# Patient Record
Sex: FEMALE | Race: WHITE | Employment: UNEMPLOYED | URBAN - METROPOLITAN AREA
[De-identification: names, ages, dates, MRNs, and addresses within clinical notes are randomized per-mention and may not be internally consistent; named-entity substitution may affect disease eponyms.]

---

## 2019-07-15 ENCOUNTER — HOSPITAL ENCOUNTER (INPATIENT)
Age: 24
LOS: 2 days | Discharge: HOME | DRG: 560 | End: 2019-07-17
Attending: OBSTETRICS & GYNECOLOGY | Admitting: OBSTETRICS & GYNECOLOGY
Payer: COMMERCIAL

## 2019-07-15 PROBLEM — Z34.93 NORMAL PREGNANCY IN THIRD TRIMESTER: Status: ACTIVE | Noted: 2019-07-15

## 2019-07-15 LAB
ABO/RH: NORMAL
ALBUMIN SERPL-MCNC: 3.4 G/DL (ref 3.5–5.2)
ALP BLD-CCNC: 169 U/L (ref 35–104)
ALT SERPL-CCNC: 9 U/L (ref 0–32)
AMPHETAMINE SCREEN, URINE: NOT DETECTED
ANION GAP SERPL CALCULATED.3IONS-SCNC: 14 MMOL/L (ref 7–16)
ANTIBODY SCREEN: NORMAL
AST SERPL-CCNC: 16 U/L (ref 0–31)
BARBITURATE SCREEN URINE: NOT DETECTED
BASOPHILS ABSOLUTE: 0.03 E9/L (ref 0–0.2)
BASOPHILS RELATIVE PERCENT: 0.3 % (ref 0–2)
BENZODIAZEPINE SCREEN, URINE: NOT DETECTED
BILIRUB SERPL-MCNC: 0.4 MG/DL (ref 0–1.2)
BUN BLDV-MCNC: 7 MG/DL (ref 6–20)
CALCIUM SERPL-MCNC: 8.4 MG/DL (ref 8.6–10.2)
CANNABINOID SCREEN URINE: NOT DETECTED
CHLORIDE BLD-SCNC: 105 MMOL/L (ref 98–107)
CO2: 20 MMOL/L (ref 22–29)
COCAINE METABOLITE SCREEN URINE: NOT DETECTED
CREAT SERPL-MCNC: 0.6 MG/DL (ref 0.5–1)
EOSINOPHILS ABSOLUTE: 0.05 E9/L (ref 0.05–0.5)
EOSINOPHILS RELATIVE PERCENT: 0.5 % (ref 0–6)
GFR AFRICAN AMERICAN: >60
GFR NON-AFRICAN AMERICAN: >60 ML/MIN/1.73
GLUCOSE BLD-MCNC: 144 MG/DL (ref 74–99)
HCT VFR BLD CALC: 37.4 % (ref 34–48)
HEMOGLOBIN: 12.3 G/DL (ref 11.5–15.5)
IMMATURE GRANULOCYTES #: 0.05 E9/L
IMMATURE GRANULOCYTES %: 0.5 % (ref 0–5)
LYMPHOCYTES ABSOLUTE: 1.18 E9/L (ref 1.5–4)
LYMPHOCYTES RELATIVE PERCENT: 10.8 % (ref 20–42)
MCH RBC QN AUTO: 29.1 PG (ref 26–35)
MCHC RBC AUTO-ENTMCNC: 32.9 % (ref 32–34.5)
MCV RBC AUTO: 88.6 FL (ref 80–99.9)
METHADONE SCREEN, URINE: NOT DETECTED
MONOCYTES ABSOLUTE: 0.59 E9/L (ref 0.1–0.95)
MONOCYTES RELATIVE PERCENT: 5.4 % (ref 2–12)
NEUTROPHILS ABSOLUTE: 9.03 E9/L (ref 1.8–7.3)
NEUTROPHILS RELATIVE PERCENT: 82.5 % (ref 43–80)
OPIATE SCREEN URINE: NOT DETECTED
PDW BLD-RTO: 13.3 FL (ref 11.5–15)
PHENCYCLIDINE SCREEN URINE: NOT DETECTED
PLATELET # BLD: 312 E9/L (ref 130–450)
PMV BLD AUTO: 10 FL (ref 7–12)
POTASSIUM SERPL-SCNC: 3.6 MMOL/L (ref 3.5–5)
PROPOXYPHENE SCREEN: NOT DETECTED
RBC # BLD: 4.22 E12/L (ref 3.5–5.5)
SODIUM BLD-SCNC: 139 MMOL/L (ref 132–146)
TOTAL PROTEIN: 6.5 G/DL (ref 6.4–8.3)
WBC # BLD: 10.9 E9/L (ref 4.5–11.5)

## 2019-07-15 PROCEDURE — 10907ZC DRAINAGE OF AMNIOTIC FLUID, THERAPEUTIC FROM PRODUCTS OF CONCEPTION, VIA NATURAL OR ARTIFICIAL OPENING: ICD-10-PCS | Performed by: OBSTETRICS & GYNECOLOGY

## 2019-07-15 PROCEDURE — 2580000003 HC RX 258: Performed by: OBSTETRICS & GYNECOLOGY

## 2019-07-15 PROCEDURE — 6360000002 HC RX W HCPCS: Performed by: OBSTETRICS & GYNECOLOGY

## 2019-07-15 PROCEDURE — 80053 COMPREHEN METABOLIC PANEL: CPT

## 2019-07-15 PROCEDURE — 36415 COLL VENOUS BLD VENIPUNCTURE: CPT

## 2019-07-15 PROCEDURE — 0KQM0ZZ REPAIR PERINEUM MUSCLE, OPEN APPROACH: ICD-10-PCS | Performed by: OBSTETRICS & GYNECOLOGY

## 2019-07-15 PROCEDURE — 86901 BLOOD TYPING SEROLOGIC RH(D): CPT

## 2019-07-15 PROCEDURE — 6370000000 HC RX 637 (ALT 250 FOR IP): Performed by: OBSTETRICS & GYNECOLOGY

## 2019-07-15 PROCEDURE — 86850 RBC ANTIBODY SCREEN: CPT

## 2019-07-15 PROCEDURE — 7200000001 HC VAGINAL DELIVERY

## 2019-07-15 PROCEDURE — 1220000001 HC SEMI PRIVATE L&D R&B

## 2019-07-15 PROCEDURE — 86900 BLOOD TYPING SEROLOGIC ABO: CPT

## 2019-07-15 PROCEDURE — 85025 COMPLETE CBC W/AUTO DIFF WBC: CPT

## 2019-07-15 PROCEDURE — 80307 DRUG TEST PRSMV CHEM ANLYZR: CPT

## 2019-07-15 RX ORDER — LANOLIN 100 %
OINTMENT (GRAM) TOPICAL PRN
Status: DISCONTINUED | OUTPATIENT
Start: 2019-07-15 | End: 2019-07-17 | Stop reason: HOSPADM

## 2019-07-15 RX ORDER — HYDROCODONE BITARTRATE AND ACETAMINOPHEN 5; 325 MG/1; MG/1
1 TABLET ORAL EVERY 4 HOURS PRN
Status: DISCONTINUED | OUTPATIENT
Start: 2019-07-15 | End: 2019-07-17 | Stop reason: HOSPADM

## 2019-07-15 RX ORDER — IBUPROFEN 800 MG/1
800 TABLET ORAL EVERY 8 HOURS PRN
Status: DISCONTINUED | OUTPATIENT
Start: 2019-07-15 | End: 2019-07-17 | Stop reason: HOSPADM

## 2019-07-15 RX ORDER — SODIUM CHLORIDE, SODIUM LACTATE, POTASSIUM CHLORIDE, CALCIUM CHLORIDE 600; 310; 30; 20 MG/100ML; MG/100ML; MG/100ML; MG/100ML
INJECTION, SOLUTION INTRAVENOUS CONTINUOUS
Status: DISCONTINUED | OUTPATIENT
Start: 2019-07-15 | End: 2019-07-15

## 2019-07-15 RX ORDER — SODIUM CHLORIDE 0.9 % (FLUSH) 0.9 %
10 SYRINGE (ML) INJECTION EVERY 12 HOURS SCHEDULED
Status: DISCONTINUED | OUTPATIENT
Start: 2019-07-15 | End: 2019-07-17 | Stop reason: HOSPADM

## 2019-07-15 RX ORDER — ACETAMINOPHEN 325 MG/1
650 TABLET ORAL EVERY 4 HOURS PRN
Status: DISCONTINUED | OUTPATIENT
Start: 2019-07-15 | End: 2019-07-17 | Stop reason: HOSPADM

## 2019-07-15 RX ORDER — HYDROCODONE BITARTRATE AND ACETAMINOPHEN 5; 325 MG/1; MG/1
2 TABLET ORAL EVERY 4 HOURS PRN
Status: DISCONTINUED | OUTPATIENT
Start: 2019-07-15 | End: 2019-07-17 | Stop reason: HOSPADM

## 2019-07-15 RX ORDER — ACETAMINOPHEN 325 MG/1
650 TABLET ORAL EVERY 4 HOURS PRN
Status: DISCONTINUED | OUTPATIENT
Start: 2019-07-15 | End: 2019-07-15

## 2019-07-15 RX ORDER — ONDANSETRON 2 MG/ML
4 INJECTION INTRAMUSCULAR; INTRAVENOUS EVERY 6 HOURS PRN
Status: DISCONTINUED | OUTPATIENT
Start: 2019-07-15 | End: 2019-07-15

## 2019-07-15 RX ORDER — PENICILLIN G POTASSIUM 5000000 [IU]/1
INJECTION, POWDER, FOR SOLUTION INTRAMUSCULAR; INTRAVENOUS
Status: DISCONTINUED
Start: 2019-07-15 | End: 2019-07-15

## 2019-07-15 RX ORDER — DOCUSATE SODIUM 100 MG/1
100 CAPSULE, LIQUID FILLED ORAL 2 TIMES DAILY
Status: DISCONTINUED | OUTPATIENT
Start: 2019-07-15 | End: 2019-07-17 | Stop reason: HOSPADM

## 2019-07-15 RX ORDER — DOCUSATE SODIUM 100 MG/1
100 CAPSULE, LIQUID FILLED ORAL 2 TIMES DAILY
Status: DISCONTINUED | OUTPATIENT
Start: 2019-07-15 | End: 2019-07-15

## 2019-07-15 RX ORDER — SODIUM CHLORIDE 0.9 % (FLUSH) 0.9 %
10 SYRINGE (ML) INJECTION PRN
Status: DISCONTINUED | OUTPATIENT
Start: 2019-07-15 | End: 2019-07-15

## 2019-07-15 RX ORDER — IBUPROFEN 800 MG/1
800 TABLET ORAL EVERY 6 HOURS PRN
Status: DISCONTINUED | OUTPATIENT
Start: 2019-07-15 | End: 2019-07-15 | Stop reason: SDUPTHER

## 2019-07-15 RX ORDER — IBUPROFEN 800 MG/1
TABLET ORAL
Status: DISPENSED
Start: 2019-07-15 | End: 2019-07-16

## 2019-07-15 RX ORDER — SODIUM CHLORIDE 0.9 % (FLUSH) 0.9 %
10 SYRINGE (ML) INJECTION EVERY 12 HOURS SCHEDULED
Status: DISCONTINUED | OUTPATIENT
Start: 2019-07-15 | End: 2019-07-15

## 2019-07-15 RX ORDER — SODIUM CHLORIDE 0.9 % (FLUSH) 0.9 %
10 SYRINGE (ML) INJECTION PRN
Status: DISCONTINUED | OUTPATIENT
Start: 2019-07-15 | End: 2019-07-17 | Stop reason: HOSPADM

## 2019-07-15 RX ADMIN — IBUPROFEN 800 MG: 800 TABLET, FILM COATED ORAL at 16:10

## 2019-07-15 RX ADMIN — Medication 999 MILLI-UNITS/MIN: at 14:35

## 2019-07-15 RX ADMIN — WITCH HAZEL 40 EACH: 500 SOLUTION RECTAL; TOPICAL at 16:10

## 2019-07-15 RX ADMIN — SODIUM CHLORIDE, POTASSIUM CHLORIDE, SODIUM LACTATE AND CALCIUM CHLORIDE: 600; 310; 30; 20 INJECTION, SOLUTION INTRAVENOUS at 14:20

## 2019-07-15 RX ADMIN — BENZOCAINE AND LEVOMENTHOL: 200; 5 SPRAY TOPICAL at 16:11

## 2019-07-15 SDOH — HEALTH STABILITY: MENTAL HEALTH: HOW OFTEN DO YOU HAVE A DRINK CONTAINING ALCOHOL?: NEVER

## 2019-07-15 ASSESSMENT — PAIN SCALES - GENERAL: PAINLEVEL_OUTOF10: 3

## 2019-07-15 NOTE — PROGRESS NOTES
RN's x 2 in room, patient restless in bed, co urge to push. Breathing techniques reviewed. Reassurance given. FHT and Uterine activity assessed q15 min per protocol.

## 2019-07-15 NOTE — PROGRESS NOTES
Patient presents to unit with co contractions since midnight. Perceives fetal movement, denies bleeding or leaking of fluid. Significant other present and supportive.

## 2019-07-16 PROCEDURE — 1220000001 HC SEMI PRIVATE L&D R&B

## 2019-07-16 PROCEDURE — 6370000000 HC RX 637 (ALT 250 FOR IP): Performed by: OBSTETRICS & GYNECOLOGY

## 2019-07-16 RX ADMIN — HYDROCODONE BITARTRATE AND ACETAMINOPHEN 1 TABLET: 5; 325 TABLET ORAL at 12:36

## 2019-07-16 RX ADMIN — DOCUSATE SODIUM 100 MG: 100 CAPSULE, LIQUID FILLED ORAL at 09:12

## 2019-07-16 RX ADMIN — DOCUSATE SODIUM 100 MG: 100 CAPSULE, LIQUID FILLED ORAL at 20:47

## 2019-07-16 ASSESSMENT — PAIN SCALES - GENERAL: PAINLEVEL_OUTOF10: 5

## 2019-07-16 NOTE — PLAN OF CARE
Problem: Pain - Acute:  Goal: Pain level will decrease  Description  Pain level will decrease  Outcome: Met This Shift  Note:   Rest, reposition, prn pain meds.

## 2019-07-16 NOTE — PLAN OF CARE
Problem: Infection - Risk of, Puerperal Infection:  Goal: Will show no infection signs and symptoms  Description  Will show no infection signs and symptoms  Outcome: Met This Shift  Note:   Vital signs obtained and within normal limits.

## 2019-07-17 VITALS
OXYGEN SATURATION: 99 % | SYSTOLIC BLOOD PRESSURE: 108 MMHG | BODY MASS INDEX: 30.12 KG/M2 | HEART RATE: 75 BPM | TEMPERATURE: 98.8 F | WEIGHT: 170 LBS | HEIGHT: 63 IN | DIASTOLIC BLOOD PRESSURE: 55 MMHG | RESPIRATION RATE: 16 BRPM

## 2019-07-17 PROBLEM — Z34.93 NORMAL PREGNANCY IN THIRD TRIMESTER: Status: RESOLVED | Noted: 2019-07-15 | Resolved: 2019-07-17

## 2019-07-17 PROCEDURE — 6370000000 HC RX 637 (ALT 250 FOR IP): Performed by: OBSTETRICS & GYNECOLOGY

## 2019-07-17 RX ADMIN — DOCUSATE SODIUM 100 MG: 100 CAPSULE, LIQUID FILLED ORAL at 08:39

## 2019-07-17 NOTE — PROGRESS NOTES
Department of Obstetrics and Gynecology  Labor and Delivery  Post Partum Progress Note    Postpartum Day # 2    SUBJECTIVE:  Pt resting in bed, reports feeling well, denies emotional concerns. Breast feeding with out difficulty. Ambulatory per self. OBJECTIVE:     Vitals:  BP (!) 108/55   Pulse 75   Temp 98.8 °F (37.1 °C) (Oral)   Resp 16   Ht 5' 3\" (1.6 m)   Wt 170 lb (77.1 kg)   SpO2 99%   Breastfeeding?  Unknown   BMI 30.11 kg/m²     LUNGS:  No increased work of breathing, good air exchange, clear to auscultation bilaterally, no crackles or wheezing  CARDIOVASCULAR:  Normal apical impulse, regular rate and rhythm, normal S1 and S2, no S3 or S4, and no murmur noted  ABDOMEN:  normal bowel sounds, soft and non tender  GENITAL/URINARY:  External Genitalia:  General appearance; normal, Hair distribution; normal, Lesions absent  Uterus: @ 2 below U  Minimal Lochia       DATA:    Blood Type:  No results found for: ABOINT  RH:  No results found for: ANATITER, C3, C4, RF  CBC:    Lab Results   Component Value Date    WBC 10.9 07/15/2019    RBC 4.22 07/15/2019    HGB 12.3 07/15/2019    HCT 37.4 07/15/2019    MCV 88.6 07/15/2019    RDW 13.3 07/15/2019     07/15/2019       ASSESSMENT & PLAN:   PPD2   SVB   Breast feeding     Discharge home pending infant

## 2019-07-17 NOTE — CARE COORDINATION
SS Note:  SS Consult received regarding \"FOB arrested in department, ? drugs\" informed by nursing that 2 \" bounty hunters\" came to the unit along with hospital security to apprehend FOB who was in pt's room for an outstanding warrant and he was taken to [facility], sw met with pt supportively and regarding consult, pt was very pleasant, had her  with her and her mother at her bedside who was aware of situation and also supportive, she reports that the charge was drug related but was from over 2 years ago and because he missed his court date, says he will be now going to court tomorrow and should then be released, she reports having a 3year old at home, she denies any current issues of drugs or any safety concerns for herself or her children and that she will have assistance from her mother as needed, no ss needs or parenting concerns relayed. Nursing informed and relayed no concerns with pt or with her parenting.  Electronically signed by KATIUSKA Valerio on 2019 at 12:08 PM

## 2021-11-16 ENCOUNTER — HOSPITAL ENCOUNTER (INPATIENT)
Age: 26
LOS: 2 days | Discharge: HOME | DRG: 560 | End: 2021-11-18
Attending: OBSTETRICS & GYNECOLOGY | Admitting: OBSTETRICS & GYNECOLOGY
Payer: COMMERCIAL

## 2021-11-16 ENCOUNTER — APPOINTMENT (OUTPATIENT)
Dept: LABOR AND DELIVERY | Age: 26
DRG: 560 | End: 2021-11-16
Payer: COMMERCIAL

## 2021-11-16 PROBLEM — Z34.90 ENCOUNTER FOR INDUCTION OF LABOR: Status: ACTIVE | Noted: 2021-11-16

## 2021-11-16 LAB
ABO/RH: NORMAL
ALBUMIN SERPL-MCNC: 3.6 G/DL (ref 3.5–5.2)
ALP BLD-CCNC: 111 U/L (ref 35–104)
ALT SERPL-CCNC: 8 U/L (ref 0–32)
AMPHETAMINE SCREEN, URINE: NOT DETECTED
ANION GAP SERPL CALCULATED.3IONS-SCNC: 14 MMOL/L (ref 7–16)
ANTIBODY SCREEN: NORMAL
AST SERPL-CCNC: 12 U/L (ref 0–31)
BARBITURATE SCREEN URINE: NOT DETECTED
BASOPHILS ABSOLUTE: 0.02 E9/L (ref 0–0.2)
BASOPHILS RELATIVE PERCENT: 0.3 % (ref 0–2)
BENZODIAZEPINE SCREEN, URINE: NOT DETECTED
BILIRUB SERPL-MCNC: 0.3 MG/DL (ref 0–1.2)
BUN BLDV-MCNC: 7 MG/DL (ref 6–20)
CALCIUM SERPL-MCNC: 8.5 MG/DL (ref 8.6–10.2)
CANNABINOID SCREEN URINE: NOT DETECTED
CHLORIDE BLD-SCNC: 104 MMOL/L (ref 98–107)
CO2: 19 MMOL/L (ref 22–29)
COCAINE METABOLITE SCREEN URINE: NOT DETECTED
CREAT SERPL-MCNC: 0.5 MG/DL (ref 0.5–1)
EOSINOPHILS ABSOLUTE: 0.05 E9/L (ref 0.05–0.5)
EOSINOPHILS RELATIVE PERCENT: 0.8 % (ref 0–6)
FENTANYL SCREEN, URINE: NOT DETECTED
GFR AFRICAN AMERICAN: >60
GFR NON-AFRICAN AMERICAN: >60 ML/MIN/1.73
GLUCOSE BLD-MCNC: 63 MG/DL (ref 74–99)
HCT VFR BLD CALC: 34.3 % (ref 34–48)
HEMOGLOBIN: 11 G/DL (ref 11.5–15.5)
IMMATURE GRANULOCYTES #: 0.02 E9/L
IMMATURE GRANULOCYTES %: 0.3 % (ref 0–5)
LYMPHOCYTES ABSOLUTE: 1.34 E9/L (ref 1.5–4)
LYMPHOCYTES RELATIVE PERCENT: 21.6 % (ref 20–42)
Lab: NORMAL
MCH RBC QN AUTO: 27.8 PG (ref 26–35)
MCHC RBC AUTO-ENTMCNC: 32.1 % (ref 32–34.5)
MCV RBC AUTO: 86.8 FL (ref 80–99.9)
METHADONE SCREEN, URINE: NOT DETECTED
MONOCYTES ABSOLUTE: 0.45 E9/L (ref 0.1–0.95)
MONOCYTES RELATIVE PERCENT: 7.3 % (ref 2–12)
NEUTROPHILS ABSOLUTE: 4.32 E9/L (ref 1.8–7.3)
NEUTROPHILS RELATIVE PERCENT: 69.7 % (ref 43–80)
OPIATE SCREEN URINE: NOT DETECTED
OXYCODONE URINE: NOT DETECTED
PDW BLD-RTO: 13.5 FL (ref 11.5–15)
PHENCYCLIDINE SCREEN URINE: NOT DETECTED
PLATELET # BLD: 272 E9/L (ref 130–450)
PMV BLD AUTO: 10.2 FL (ref 7–12)
POTASSIUM SERPL-SCNC: 3.8 MMOL/L (ref 3.5–5)
RBC # BLD: 3.95 E12/L (ref 3.5–5.5)
SODIUM BLD-SCNC: 137 MMOL/L (ref 132–146)
TOTAL PROTEIN: 6.2 G/DL (ref 6.4–8.3)
WBC # BLD: 6.2 E9/L (ref 4.5–11.5)

## 2021-11-16 PROCEDURE — 1220000001 HC SEMI PRIVATE L&D R&B

## 2021-11-16 PROCEDURE — 85025 COMPLETE CBC W/AUTO DIFF WBC: CPT

## 2021-11-16 PROCEDURE — 6360000002 HC RX W HCPCS: Performed by: OBSTETRICS & GYNECOLOGY

## 2021-11-16 PROCEDURE — 86901 BLOOD TYPING SEROLOGIC RH(D): CPT

## 2021-11-16 PROCEDURE — 80053 COMPREHEN METABOLIC PANEL: CPT

## 2021-11-16 PROCEDURE — 86850 RBC ANTIBODY SCREEN: CPT

## 2021-11-16 PROCEDURE — 36415 COLL VENOUS BLD VENIPUNCTURE: CPT

## 2021-11-16 PROCEDURE — 86900 BLOOD TYPING SEROLOGIC ABO: CPT

## 2021-11-16 PROCEDURE — 2580000003 HC RX 258: Performed by: OBSTETRICS & GYNECOLOGY

## 2021-11-16 PROCEDURE — 80307 DRUG TEST PRSMV CHEM ANLYZR: CPT

## 2021-11-16 RX ORDER — SODIUM CHLORIDE, SODIUM LACTATE, POTASSIUM CHLORIDE, CALCIUM CHLORIDE 600; 310; 30; 20 MG/100ML; MG/100ML; MG/100ML; MG/100ML
INJECTION, SOLUTION INTRAVENOUS CONTINUOUS
Status: DISCONTINUED | OUTPATIENT
Start: 2021-11-16 | End: 2021-11-18 | Stop reason: HOSPADM

## 2021-11-16 RX ORDER — SODIUM CHLORIDE, SODIUM LACTATE, POTASSIUM CHLORIDE, AND CALCIUM CHLORIDE .6; .31; .03; .02 G/100ML; G/100ML; G/100ML; G/100ML
500 INJECTION, SOLUTION INTRAVENOUS PRN
Status: DISCONTINUED | OUTPATIENT
Start: 2021-11-16 | End: 2021-11-18 | Stop reason: HOSPADM

## 2021-11-16 RX ORDER — NALBUPHINE HCL 10 MG/ML
10 AMPUL (ML) INJECTION
Status: DISCONTINUED | OUTPATIENT
Start: 2021-11-16 | End: 2021-11-18 | Stop reason: HOSPADM

## 2021-11-16 RX ORDER — ONDANSETRON 2 MG/ML
4 INJECTION INTRAMUSCULAR; INTRAVENOUS EVERY 6 HOURS PRN
Status: DISCONTINUED | OUTPATIENT
Start: 2021-11-16 | End: 2021-11-18 | Stop reason: HOSPADM

## 2021-11-16 RX ORDER — SODIUM CHLORIDE 9 MG/ML
25 INJECTION, SOLUTION INTRAVENOUS PRN
Status: DISCONTINUED | OUTPATIENT
Start: 2021-11-16 | End: 2021-11-18 | Stop reason: HOSPADM

## 2021-11-16 RX ORDER — DOCUSATE SODIUM 100 MG/1
100 CAPSULE, LIQUID FILLED ORAL 2 TIMES DAILY
Status: DISCONTINUED | OUTPATIENT
Start: 2021-11-16 | End: 2021-11-17 | Stop reason: ALTCHOICE

## 2021-11-16 RX ORDER — SODIUM CHLORIDE 0.9 % (FLUSH) 0.9 %
5-40 SYRINGE (ML) INJECTION PRN
Status: DISCONTINUED | OUTPATIENT
Start: 2021-11-16 | End: 2021-11-18 | Stop reason: HOSPADM

## 2021-11-16 RX ORDER — SODIUM CHLORIDE 0.9 % (FLUSH) 0.9 %
5-40 SYRINGE (ML) INJECTION EVERY 12 HOURS SCHEDULED
Status: DISCONTINUED | OUTPATIENT
Start: 2021-11-16 | End: 2021-11-18 | Stop reason: HOSPADM

## 2021-11-16 RX ORDER — BUTORPHANOL TARTRATE 1 MG/ML
1 INJECTION, SOLUTION INTRAMUSCULAR; INTRAVENOUS
Status: DISCONTINUED | OUTPATIENT
Start: 2021-11-16 | End: 2021-11-16

## 2021-11-16 RX ORDER — SODIUM CHLORIDE, SODIUM LACTATE, POTASSIUM CHLORIDE, AND CALCIUM CHLORIDE .6; .31; .03; .02 G/100ML; G/100ML; G/100ML; G/100ML
1000 INJECTION, SOLUTION INTRAVENOUS PRN
Status: DISCONTINUED | OUTPATIENT
Start: 2021-11-16 | End: 2021-11-18 | Stop reason: HOSPADM

## 2021-11-16 RX ADMIN — SODIUM CHLORIDE, POTASSIUM CHLORIDE, SODIUM LACTATE AND CALCIUM CHLORIDE: 600; 310; 30; 20 INJECTION, SOLUTION INTRAVENOUS at 11:45

## 2021-11-16 RX ADMIN — Medication 2 MILLI-UNITS/MIN: at 13:00

## 2021-11-16 RX ADMIN — SODIUM CHLORIDE, POTASSIUM CHLORIDE, SODIUM LACTATE AND CALCIUM CHLORIDE: 600; 310; 30; 20 INJECTION, SOLUTION INTRAVENOUS at 15:37

## 2021-11-16 RX ADMIN — SODIUM CHLORIDE, POTASSIUM CHLORIDE, SODIUM LACTATE AND CALCIUM CHLORIDE: 600; 310; 30; 20 INJECTION, SOLUTION INTRAVENOUS at 21:58

## 2021-11-16 NOTE — PROGRESS NOTES
40w3d  here for scheduled IOL. Denies and LOF, bleeding, or SOB. States she is feeling occasional contractions but denies any pain at this time. Vitals are stable. EFM and Burdette applied. Significant other is at the bedside. POC discussed. Call bell is within reach. States no needs at this time.

## 2021-11-17 PROCEDURE — 1220000001 HC SEMI PRIVATE L&D R&B

## 2021-11-17 PROCEDURE — 7200000001 HC VAGINAL DELIVERY

## 2021-11-17 PROCEDURE — 6360000002 HC RX W HCPCS: Performed by: OBSTETRICS & GYNECOLOGY

## 2021-11-17 PROCEDURE — 3E033VJ INTRODUCTION OF OTHER HORMONE INTO PERIPHERAL VEIN, PERCUTANEOUS APPROACH: ICD-10-PCS | Performed by: OBSTETRICS & GYNECOLOGY

## 2021-11-17 PROCEDURE — 6370000000 HC RX 637 (ALT 250 FOR IP): Performed by: OBSTETRICS & GYNECOLOGY

## 2021-11-17 PROCEDURE — 0HQ9XZZ REPAIR PERINEUM SKIN, EXTERNAL APPROACH: ICD-10-PCS | Performed by: OBSTETRICS & GYNECOLOGY

## 2021-11-17 PROCEDURE — 6370000000 HC RX 637 (ALT 250 FOR IP)

## 2021-11-17 RX ORDER — KETOROLAC TROMETHAMINE 30 MG/ML
30 INJECTION, SOLUTION INTRAMUSCULAR; INTRAVENOUS EVERY 6 HOURS PRN
Status: ACTIVE | OUTPATIENT
Start: 2021-11-17 | End: 2021-11-17

## 2021-11-17 RX ORDER — FERROUS SULFATE 325(65) MG
325 TABLET ORAL 2 TIMES DAILY WITH MEALS
Status: DISCONTINUED | OUTPATIENT
Start: 2021-11-17 | End: 2021-11-18 | Stop reason: HOSPADM

## 2021-11-17 RX ORDER — ACETAMINOPHEN 325 MG/1
650 TABLET ORAL EVERY 4 HOURS PRN
Status: DISCONTINUED | OUTPATIENT
Start: 2021-11-17 | End: 2021-11-18 | Stop reason: HOSPADM

## 2021-11-17 RX ORDER — OXYCODONE HYDROCHLORIDE AND ACETAMINOPHEN 5; 325 MG/1; MG/1
1 TABLET ORAL EVERY 4 HOURS PRN
Status: DISCONTINUED | OUTPATIENT
Start: 2021-11-17 | End: 2021-11-18 | Stop reason: HOSPADM

## 2021-11-17 RX ORDER — IBUPROFEN 800 MG/1
800 TABLET ORAL EVERY 8 HOURS PRN
Status: DISCONTINUED | OUTPATIENT
Start: 2021-11-17 | End: 2021-11-18 | Stop reason: HOSPADM

## 2021-11-17 RX ORDER — OXYCODONE HYDROCHLORIDE AND ACETAMINOPHEN 5; 325 MG/1; MG/1
2 TABLET ORAL EVERY 4 HOURS PRN
Status: DISCONTINUED | OUTPATIENT
Start: 2021-11-17 | End: 2021-11-18 | Stop reason: HOSPADM

## 2021-11-17 RX ORDER — DOCUSATE SODIUM 100 MG/1
100 CAPSULE, LIQUID FILLED ORAL DAILY
Status: DISCONTINUED | OUTPATIENT
Start: 2021-11-17 | End: 2021-11-18 | Stop reason: HOSPADM

## 2021-11-17 RX ORDER — MODIFIED LANOLIN
OINTMENT (GRAM) TOPICAL PRN
Status: DISCONTINUED | OUTPATIENT
Start: 2021-11-17 | End: 2021-11-18 | Stop reason: HOSPADM

## 2021-11-17 RX ADMIN — IBUPROFEN 800 MG: 800 TABLET, FILM COATED ORAL at 18:49

## 2021-11-17 RX ADMIN — NALBUPHINE HYDROCHLORIDE 10 MG: 10 INJECTION, SOLUTION INTRAMUSCULAR; INTRAVENOUS; SUBCUTANEOUS at 01:07

## 2021-11-17 RX ADMIN — WITCH HAZEL 40 EACH: 500 SOLUTION RECTAL; TOPICAL at 09:49

## 2021-11-17 RX ADMIN — IBUPROFEN 800 MG: 800 TABLET, FILM COATED ORAL at 10:44

## 2021-11-17 RX ADMIN — ACETAMINOPHEN 650 MG: 325 TABLET ORAL at 18:09

## 2021-11-17 RX ADMIN — BENZOCAINE AND LEVOMENTHOL: 200; 5 SPRAY TOPICAL at 09:48

## 2021-11-17 ASSESSMENT — PAIN SCALES - GENERAL
PAINLEVEL_OUTOF10: 10
PAINLEVEL_OUTOF10: 4
PAINLEVEL_OUTOF10: 4
PAINLEVEL_OUTOF10: 5
PAINLEVEL_OUTOF10: 4

## 2021-11-17 ASSESSMENT — PAIN DESCRIPTION - DESCRIPTORS
DESCRIPTORS: ACHING;CRAMPING
DESCRIPTORS: ACHING;CRAMPING

## 2021-11-17 NOTE — L&D DELIVERY SUMMARY NOTE
Department of Obstetrics and Gynecology  Spontaneous Vaginal Delivery Note  Erika Nino [facility],[id number]    Pre-operative Diagnosis: Term pregnancy induced with Pitocin, GBS negative    Post-operative Diagnosis:  Living  infant(s) and Female    Information for the patient's :  Ansley Nino [[id number]]             Female infant            Infant Wt:   Information for the patient's :  Lexx Wiley [[id number]]        8 pound 7 ounce    APGARS:     Information for the patient's :  Ansley Nino [[id number]]        Apgars 9 at 1 minute 9 at 5 minutes     Anesthesia:  none    Application and Delivery: First-degree perineal tear    Delivery Summary:   Patient is admitted to R and placed on external monitors. Contractions are irregular, Pitocin started as per protocol FHT reactive with moderate variability without decels. Pt received analgesics IV external monitoring was continued through the night and Pitocin was stopped at around 11:00 at night and restarted at 3:00 and . Progress of labor as anticipated and now fully dilated cervix. With subsequent contractions she started pushing and delivered vaginally spontaneously without any complications with first-degree perineal tear,Placenta and cord and membranes delivered spontaneously. Pt is given pitocin in IV fluids. . First-degree perineal tear is repaired in standard manner. Vaginal walls,cervix,perineum,rectum intact. Uterus is well contracted. Pt is watched for next 1 hour. mother and baby both are  stable and doing well. Routine postpartum care    Specimen:  Placenta sent to pathology No    Estimated blood loss: 400 cc             Condition:  infant stable to general nursery and mother stable               Infant Feeding:    both breast and bottle - Similac with low iron    Johanny Max MD,MRORY. 2021 8:49 AM    Emiliana Bryant

## 2021-11-17 NOTE — H&P
Department of Obstetrics and Gynecology   Obstetrics History and Physical      [facility]  2021  [id number]    CHIEF COMPLAINT: Term pregnancy for induction of labor    HISTORY OF PRESENT ILLNESS:      The patient is a 32 y.o. female  at 44w3d. For induction of labor with Pitocin as Rose score is approximately 6  OB History        3    Para   2    Term   1       0    AB   0    Living   1       SAB   0    IAB   0    Ectopic   0    Molar   0    Multiple   0    Live Births   1            Patient presents with a chief complaint as above and is being admitted for induction of labor with Pitocin    Estimated Due Date: Estimated Date of Delivery: 21    PRENATAL CARE:    Complicated by:   Patient Active Problem List   Diagnosis Code    Term birth of  male Z45.0   [facility] Encounter for induction of labor Z34.90       PAST OB HISTORY  OB History        3    Para   2    Term   1       0    AB   0    Living   1       SAB   0    IAB   0    Ectopic   0    Molar   0    Multiple   0    Live Births   1                Past Medical History:    History reviewed. No pertinent past medical history. Past Surgical History:    History reviewed. No pertinent surgical history. Allergies:  Patient has no known allergies.   Social History:    Social History     Socioeconomic History    Marital status: Single     Spouse name: Not on file    Number of children: Not on file    Years of education: Not on file    Highest education level: Not on file   Occupational History    Not on file   Tobacco Use    Smoking status: Never Smoker    Smokeless tobacco: Never Used   Vaping Use    Vaping Use: Never used   Substance and Sexual Activity    Alcohol use: Never    Drug use: Never    Sexual activity: Never   Other Topics Concern    Not on file   Social History Narrative    Not on file     Social Determinants of Health     Financial Resource Strain:     Difficulty of Paying Living Expenses: Not on file   Food Insecurity:     Worried About [address] in the Last Year: Not on file    Dharmesh of Food in the Last Year: Not on file   Transportation Needs:     Lack of Transportation (Medical): Not on file    Lack of Transportation (Non-Medical): Not on file   Physical Activity:     Days of Exercise per Week: Not on file    Minutes of Exercise per Session: Not on file   Stress:     Feeling of Stress : Not on file   Social Connections:     Frequency of Communication with Friends and Family: Not on file    Frequency of Social Gatherings with Friends and Family: Not on file    Attends [employer] Services: Not on file    Active Member of [address] [place] [address] or Organizations: Not on file    Attends Club or Organization Meetings: Not on file    Marital Status: Not on file   Intimate Partner Violence:     Fear of Current or Ex-Partner: Not on file    Emotionally Abused: Not on file    Physically Abused: Not on file    Sexually Abused: Not on file   Housing Stability:     Unable to Pay for Housing in the Last Year: Not on file    Number of [place] in the Last Year: Not on file    Unstable Housing in the Last Year: Not on file     Family History:   History reviewed. No pertinent family history. Medications Prior to Admission:  Medications Prior to Admission: Prenatal MV-Min-Fe Fum-FA-DHA (PRENATAL 1 PO), Take 1 tablet by mouth daily    REVIEW OF SYSTEMS:    CONSTITUTIONAL:  negative  RESPIRATORY:  negative  CARDIOVASCULAR:  negative  GASTROINTESTINAL:  Negative  GENITOURINARY: Negative  ALLERGIC/IMMUNOLOGIC:  negative  NEUROLOGICAL:  negative  BEHAVIOR/PSYCH:  negative    PHYSICAL EXAM:  Blood pressure (!) 109/57, pulse 72, temperature 98.2 °F (36.8 °C), temperature source Oral, resp. rate 18, height 5' 3\" (1.6 m), weight 160 lb (72.6 kg), SpO2 99 %, unknown if currently breastfeeding.   General appearance:  awake, alert, cooperative, no apparent distress, and appears stated age  Abdomen:  Gravid uterus, consistent with her gestational age 44w3d, irregular uterine contractions. , CVA tenderness No      Fetal heart rate:  Reassuring.  140,with accels and moderate variability,no decels,  Pelvis:  Adequate pelvis  Cervix: soft   2 cm   50%    -2  Presentation: CEPHALIC  Membranes:  intact  Extremities: nontender bilaterally,edema1+    DATA:  Labs:  CBC:   Lab Results   Component Value Date    WBC 6.2 11/16/2021    RBC 3.95 11/16/2021    HGB 11.0 11/16/2021    HCT 34.3 11/16/2021    MCV 86.8 11/16/2021    RDW 13.5 11/16/2021     11/16/2021     CMP:    Lab Results   Component Value Date     11/16/2021    K 3.8 11/16/2021     11/16/2021    CO2 19 11/16/2021    BUN 7 11/16/2021    PROT 6.2 11/16/2021     U/A:  No components found for: Mirian Thakkar, USPGRAV, UPH, UPROTEIN, UGLUCOSE, UKETONE, UBILI, UBLOOD, UNITRITE, UUROBIL, ULEUKEST, USQEPI, URENEPI, UWBC, URBC, Synchari, UHYALINE  TSH:  No results found for: TSH  RPR:  No results found for: RPR  RUBELLA: No results found for: RUBELLAIGG  HEPBSAG: No results found for: HBSAGI  HIV:  No results found for: HIV  HgBA1c:  No components found for: HGBA1C  Blood Type:  No results found for: ABOINT  RH:  No results found for: ANATITER, C3, C4, RF  Antibody Screen:  No components found for: ABSCINT  Gonorrhea:  No components found for: PRBCHLGC  Chlamydia:  No components found for: CCHLAM  gbs-STREP B SCREEN: No results found for: GBSAG    No orders to display       Results for orders placed or performed during the hospital encounter of 11/16/21   CBC auto differential   Result Value Ref Range    WBC 6.2 4.5 - 11.5 E9/L    RBC 3.95 3.50 - 5.50 E12/L    Hemoglobin 11.0 (L) 11.5 - 15.5 g/dL    Hematocrit 34.3 34.0 - 48.0 %    MCV 86.8 80.0 - 99.9 fL    MCH 27.8 26.0 - 35.0 pg    MCHC 32.1 32.0 - 34.5 %    RDW 13.5 11.5 - 15.0 fL    Platelets 664 428 - 914 E9/L    MPV 10.2 7.0 - 12.0 fL    Neutrophils % 69.7 43.0 - 80.0 %    Immature Granulocytes % 0.3 0.0 - 5.0 %    Lymphocytes % 21.6 20.0 - 42.0 %    Monocytes % 7.3 2.0 - 12.0 %    Eosinophils % 0.8 0.0 - 6.0 %    Basophils % 0.3 0.0 - 2.0 %    Neutrophils Absolute 4.32 1.80 - 7.30 E9/L    Immature Granulocytes # 0.02 E9/L    Lymphocytes Absolute 1.34 (L) 1.50 - 4.00 E9/L    Monocytes Absolute 0.45 0.10 - 0.95 E9/L    Eosinophils Absolute 0.05 0.05 - 0.50 E9/L    Basophils Absolute 0.02 0.00 - 0.20 E9/L   Comprehensive metabolic panel   Result Value Ref Range    Sodium 137 132 - 146 mmol/L    Potassium 3.8 3.5 - 5.0 mmol/L    Chloride 104 98 - 107 mmol/L    CO2 19 (L) 22 - 29 mmol/L    Anion Gap 14 7 - 16 mmol/L    Glucose 63 (L) 74 - 99 mg/dL    BUN 7 6 - 20 mg/dL    CREATININE 0.5 0.5 - 1.0 mg/dL    GFR Non-African American >60 >=60 mL/min/1.73    GFR African American >60     Calcium 8.5 (L) 8.6 - 10.2 mg/dL    Total Protein 6.2 (L) 6.4 - 8.3 g/dL    Albumin 3.6 3.5 - 5.2 g/dL    Total Bilirubin 0.3 0.0 - 1.2 mg/dL    Alkaline Phosphatase 111 (H) 35 - 104 U/L    ALT 8 0 - 32 U/L    AST 12 0 - 31 U/L   Urine Drug Screen   Result Value Ref Range    Amphetamine Screen, Urine NOT DETECTED Negative <1000 ng/mL    Barbiturate Screen, Ur NOT DETECTED Negative < 200 ng/mL    Benzodiazepine Screen, Urine NOT DETECTED Negative < 200 ng/mL    Cannabinoid Scrn, Ur NOT DETECTED Negative < 50ng/mL    Cocaine Metabolite Screen, Urine NOT DETECTED Negative < 300 ng/mL    Opiate Scrn, Ur NOT DETECTED Negative < 300ng/mL    PCP Screen, Urine NOT DETECTED Negative < 25 ng/mL    Methadone Screen, Urine NOT DETECTED Negative <300 ng/mL    Oxycodone Urine NOT DETECTED Negative <100 ng/mL    FENTANYL SCREEN, URINE NOT DETECTED Negative <1 ng/mL    Drug Screen Comment: see below    TYPE AND SCREEN   Result Value Ref Range    ABO/Rh A POS     Antibody Screen NEG        ASSESSMENT AND PLAN:40 week pregnancy, for induction of labor with Pitocin with a GBS negative  Active Problems:    Encounter for induction of labor  Resolved Problems:    * No resolved hospital problems.  *      Labor: OBSERVATION, anticipate normal delivery, routine labor orders  Fetus: Reassuring  GBS: Negative  IVFluids,labs,analgesics/epidural as needed        Electronically signed by Randi Napoles MD on 11/16/2021 at 10:51 PM

## 2021-11-17 NOTE — PROGRESS NOTES
Assumed care of patient for this shift. Plan of care for night discussed, patient verbalizes understanding. Patient perceives fetal movement and audible per Rn. EFM on, assessed and monitored every 15 minutes. Pitocin held per order. Call light placed within reach.

## 2021-11-17 NOTE — PROGRESS NOTES
Assumed care of pt. Pt with no needs at this time. POC discussed. Pt lying left side with peanut ball. Fetal heart rate and uterine activity being monitored every 15 minutes by RN while pt in labor. Call light in reach.

## 2021-11-17 NOTE — PLAN OF CARE
Problem: Pain:  Goal: Pain level will decrease  Description: Pain level will decrease  Outcome: Met This Shift     Problem: Anxiety:  Goal: Level of anxiety will decrease  Description: Level of anxiety will decrease  Outcome: Met This Shift

## 2021-11-17 NOTE — PROGRESS NOTES
Skin to skin stimulation initiated between mother and baby. Hamilton is pink and alert with regular respirations. Patient instructed on safe skin to skin care practices with proper positioning of baby and assurance of unobstructed airway; verbalizes understanding.

## 2021-11-18 VITALS
WEIGHT: 160 LBS | RESPIRATION RATE: 17 BRPM | OXYGEN SATURATION: 99 % | HEART RATE: 77 BPM | DIASTOLIC BLOOD PRESSURE: 58 MMHG | BODY MASS INDEX: 28.35 KG/M2 | SYSTOLIC BLOOD PRESSURE: 99 MMHG | HEIGHT: 63 IN | TEMPERATURE: 98.2 F

## 2021-11-18 LAB
HCT VFR BLD CALC: 31.7 % (ref 34–48)
HEMOGLOBIN: 10.1 G/DL (ref 11.5–15.5)

## 2021-11-18 PROCEDURE — 36415 COLL VENOUS BLD VENIPUNCTURE: CPT

## 2021-11-18 PROCEDURE — 85018 HEMOGLOBIN: CPT

## 2021-11-18 PROCEDURE — 6370000000 HC RX 637 (ALT 250 FOR IP): Performed by: OBSTETRICS & GYNECOLOGY

## 2021-11-18 PROCEDURE — 85014 HEMATOCRIT: CPT

## 2021-11-18 RX ADMIN — DOCUSATE SODIUM 100 MG: 100 CAPSULE ORAL at 07:35

## 2021-11-18 RX ADMIN — ACETAMINOPHEN 650 MG: 325 TABLET ORAL at 07:35

## 2021-11-18 ASSESSMENT — PAIN DESCRIPTION - DESCRIPTORS: DESCRIPTORS: CRAMPING

## 2021-11-18 ASSESSMENT — PAIN SCALES - GENERAL
PAINLEVEL_OUTOF10: 2
PAINLEVEL_OUTOF10: 1

## 2021-11-18 NOTE — PROGRESS NOTES
Baby found sleeping in bed with sleeping mother.  removed from bed and placed in crib. Safe sleep practices reviewed and discussed. Pt verbalizes understanding of need for baby to sleep in crib or return to nursery if tired.

## 2021-11-18 NOTE — PROGRESS NOTES
Subjective:     Postpartum Day 1:   The patient feels well. The patient denies emotional concerns. Pain is well controlled with current medications. The baby iswell. Baby is feeding via breast. Urinary output is adequate. The patient is ambulating well. The patient is tolerating a normal diet. Flatus has been passed. Objective:        Blood pressure (!) 99/58, pulse 77, temperature 98.2 °F (36.8 °C), temperature source Oral, resp. rate 17, height 5' 3\" (1.6 m), weight 160 lb (72.6 kg), SpO2 97 %, unknown if currently breastfeeding. No intake/output data recorded. Lab Results   Component Value Date    WBC 6.2 11/16/2021    HGB 10.1 (L) 11/18/2021    HCT 31.7 (L) 11/18/2021    MCV 86.8 11/16/2021     11/16/2021       General:    alert, appears stated age and cooperative   Lungs:    Lochia:  appropriate   Uterine    firmnontender   Back         no pain to palpation   DVT Evaluation:  No evidence of DVT seen on physical exam.  Negative Brian's sign.   @ LABS@    Assessment:     Postpartum day 1 doing well.female  Active Problems:    Encounter for induction of labor  Resolved Problems:    * No resolved hospital problems. *    Plan:     Continue current care. Prenatal vit daily,  Pain meds as needed, routine postpartum care      Sunday MD Bailey M.D. 11/18/2021 8:43 AM    Brandt Ireland  1995  [id number]

## 2021-11-18 NOTE — PLAN OF CARE
Problem: Pain:  Goal: Pain level will decrease  Description: Pain level will decrease  Outcome: Met This Shift  Goal: Control of acute pain  Description: Control of acute pain  Outcome: Met This Shift     Problem: Anxiety:  Goal: Level of anxiety will decrease  Description: Level of anxiety will decrease  Outcome: Met This Shift     Problem: Breathing Pattern - Ineffective:  Goal: Able to breathe comfortably  Description: Able to breathe comfortably  Outcome: Met This Shift     Problem: Fluid Volume - Imbalance:  Goal: Absence of imbalanced fluid volume signs and symptoms  Description: Absence of imbalanced fluid volume signs and symptoms  Outcome: Met This Shift  Goal: Absence of intrapartum hemorrhage signs and symptoms  Description: Absence of intrapartum hemorrhage signs and symptoms  Outcome: Met This Shift     Problem: Infection - Intrapartum Infection:  Goal: Will show no infection signs and symptoms  Description: Will show no infection signs and symptoms  Outcome: Met This Shift     Problem: Pain - Acute:  Goal: Pain level will decrease  Description: Pain level will decrease  Outcome: Met This Shift

## 2021-11-18 NOTE — DISCHARGE SUMMARY
Patient ID:  Yasmin Ashby  [id number]  13 y.o.  1995         Discharge Summary      Admit date: 2021    Discharge date and time:  2021    Admitting Physician: hSy Lang MD, M. D. FACOG     Diagnoses: Encounter for induction of labor [Z34.90] term pregnancy induction of labor with Pitocin    Discharged Condition: stable    Indication for Admission: 32 y.o. y.o. Yael Castorena admitted at Term pregnancy in :\"Induced\" labor with Pitocin without complications    Procedures Performed:      female      Information for the patient's :  Raghu Sanchez [[id number]]      . apgars 9 at 1 minute 9 at 5 minutes  Information for the patient's :  Raghu Sanchez [[id number]]          . 3827 g    Hospital Course: Patient was admitted on the day  and underwent an uncomplicated procedure. Pt received analgesics IV and /or epidural anesthesia and delivered without comps. postpartum care was uncomplicated. H/H stable,Vital stable,,Uterus nontender,perineum healing well/ incision and wound dry no bleeding or oozing, Moderate lochia,voided without probs and passed flatus. Discharge Exam:  BP (!) 99/58   Pulse 77   Temp 98.2 °F (36.8 °C) (Oral)   Resp 17   Ht 5' 3\" (1.6 m)   Wt 160 lb (72.6 kg)   SpO2 97%   Breastfeeding Unknown   BMI 28.34 kg/m²   General appearance: alert, appears stated age and cooperative  Back: symmetric, no curvature. ROM normal. No CVA tenderness. Abdomen: soft, non-tender; bowel sounds normal; no masses,  no organomegaly uterus well contracted nontender  Extremities: extremities normal, atraumatic, no cyanosis or edema    Disposition: home    Patient Instructions: Activity: activity as tolerated  Diet: regular diet  Wound Care: none needed    Discharge Medication: No alternate with Motrin, prenatal vitamin once a Dryas@Chu Shu error)@     Follow-up with Shy Lang MD, M.D. in 6 weeks.     Signed:  Shy Lang POLLO GOMEZ  11/18/2021  11:14 AM

## 2021-11-18 NOTE — PROGRESS NOTES
[address] [facility] screening results were discussed with parent. Questions answered. Brochure given to parent. Advised to monitor developmental milestones and contact physician for any concerns.    Alex Arrington

## 2023-06-27 DIAGNOSIS — Z20.818 PERTUSSIS EXPOSURE: Primary | ICD-10-CM

## 2023-06-27 RX ORDER — AZITHROMYCIN 250 MG/1
TABLET, FILM COATED ORAL
Qty: 6 TABLET | Refills: 0 | Status: SHIPPED | OUTPATIENT
Start: 2023-06-27 | End: 2023-07-02

## 2024-01-04 DIAGNOSIS — F41.1 GENERALIZED ANXIETY DISORDER: ICD-10-CM

## 2024-01-04 RX ORDER — ESCITALOPRAM OXALATE 20 MG/1
20 TABLET ORAL DAILY
Qty: 30 TABLET | Refills: 0 | Status: SHIPPED | OUTPATIENT
Start: 2024-01-04 | End: 2024-03-25

## 2024-01-04 RX ORDER — ESCITALOPRAM OXALATE 20 MG/1
20 TABLET ORAL DAILY
COMMUNITY
Start: 2023-11-20 | End: 2024-01-04 | Stop reason: SDUPTHER

## 2024-01-22 ENCOUNTER — LAB REQUISITION (OUTPATIENT)
Dept: LAB | Facility: HOSPITAL | Age: 29
End: 2024-01-22

## 2024-03-25 DIAGNOSIS — F41.1 GENERALIZED ANXIETY DISORDER: ICD-10-CM

## 2024-03-25 RX ORDER — ESCITALOPRAM OXALATE 20 MG/1
20 TABLET ORAL DAILY
Qty: 30 TABLET | Refills: 0 | Status: SHIPPED | OUTPATIENT
Start: 2024-03-25 | End: 2024-03-25 | Stop reason: SDUPTHER

## 2024-03-25 RX ORDER — ESCITALOPRAM OXALATE 20 MG/1
20 TABLET ORAL DAILY
Qty: 30 TABLET | Refills: 0 | Status: SHIPPED | OUTPATIENT
Start: 2024-03-25

## 2025-02-05 ENCOUNTER — TELEPHONE (OUTPATIENT)
Dept: PRIMARY CARE | Facility: CLINIC | Age: 30
End: 2025-02-05
Payer: MEDICAID

## 2025-02-05 DIAGNOSIS — K04.7 DENTAL INFECTION: Primary | ICD-10-CM

## 2025-02-05 RX ORDER — AMOXICILLIN 500 MG/1
500 CAPSULE ORAL EVERY 8 HOURS SCHEDULED
Qty: 30 CAPSULE | Refills: 0 | Status: SHIPPED | OUTPATIENT
Start: 2025-02-05 | End: 2025-02-15

## 2025-02-05 NOTE — TELEPHONE ENCOUNTER
CAN YOU PLEASE SEND IN AN ANTIBIOTIC IN FOR A TOOTH ACHE CAN'T GET IN TO THE DENTIST ND SHE'S IN PAIN

## 2025-04-09 ENCOUNTER — APPOINTMENT (OUTPATIENT)
Dept: PRIMARY CARE | Facility: CLINIC | Age: 30
End: 2025-04-09
Payer: MEDICAID

## 2025-07-16 ENCOUNTER — APPOINTMENT (OUTPATIENT)
Dept: OBSTETRICS AND GYNECOLOGY | Facility: CLINIC | Age: 30
End: 2025-07-16
Payer: MEDICAID

## 2025-07-16 VITALS
BODY MASS INDEX: 20.66 KG/M2 | HEIGHT: 65 IN | SYSTOLIC BLOOD PRESSURE: 114 MMHG | DIASTOLIC BLOOD PRESSURE: 62 MMHG | WEIGHT: 124 LBS

## 2025-07-16 DIAGNOSIS — Z32.02 PREGNANCY TEST NEGATIVE: ICD-10-CM

## 2025-07-16 DIAGNOSIS — R10.2 PELVIC PAIN: ICD-10-CM

## 2025-07-16 DIAGNOSIS — N93.9 ABNORMAL UTERINE BLEEDING (AUB): Primary | ICD-10-CM

## 2025-07-16 LAB — PREGNANCY TEST URINE, POC: NEGATIVE

## 2025-07-16 PROCEDURE — 81025 URINE PREGNANCY TEST: CPT | Performed by: OBSTETRICS & GYNECOLOGY

## 2025-07-16 PROCEDURE — 99204 OFFICE O/P NEW MOD 45 MIN: CPT | Performed by: OBSTETRICS & GYNECOLOGY

## 2025-07-16 PROCEDURE — 1036F TOBACCO NON-USER: CPT | Performed by: OBSTETRICS & GYNECOLOGY

## 2025-07-16 PROCEDURE — 3008F BODY MASS INDEX DOCD: CPT | Performed by: OBSTETRICS & GYNECOLOGY

## 2025-07-16 ASSESSMENT — ENCOUNTER SYMPTOMS
NEUROLOGICAL NEGATIVE: 1
CARDIOVASCULAR NEGATIVE: 1
RESPIRATORY NEGATIVE: 1
MUSCULOSKELETAL NEGATIVE: 1
PSYCHIATRIC NEGATIVE: 1
CONSTITUTIONAL NEGATIVE: 1
GASTROINTESTINAL NEGATIVE: 1

## 2025-07-16 NOTE — PROGRESS NOTES
"Subjective   Patient ID: Florecita Osorio is a 30 y.o. female who presents for New Patient Visit.  HPI    Patient presents for complaint of AUB. She reports onset of irregular bleeding about 6 months ago. Prior to irregular bleeding she had regular periods every 21 days, lasting 6 days, with moderate to heavy flow and no spotting between periods. Approximately 6 months ago she noted onset of irregular bleeding. She now notes fluctuation of cycle length from 21-30 days. Length of periods fluctuate but can be up to 8 days. Flow is excessively heavy with episodes of gushing, soaking through clothing, and passage of larger clots. She notes significant discomfort/heavy cramping with menses which is somewhat alleviated by Tylenol/Ibuprofen and heat pads. She additionally notes pelvic pain when she is not on menses, as well as deep dyspareunia.     Review of Systems   Constitutional: Negative.    Respiratory: Negative.     Cardiovascular: Negative.    Gastrointestinal: Negative.    Genitourinary:  Positive for pelvic pain and vaginal bleeding.   Musculoskeletal: Negative.    Neurological: Negative.    Psychiatric/Behavioral: Negative.         Objective   Visit Vitals  /62   Ht 1.651 m (5' 5\")   Wt 56.2 kg (124 lb)   LMP 07/01/2025 (Approximate)   BMI 20.63 kg/m²   OB Status Having periods   Smoking Status Never   BSA 1.61 m²       Physical Exam  Constitutional:       Appearance: Normal appearance.   Pulmonary:      Effort: Pulmonary effort is normal.   Genitourinary:     Comments: Vulva normal in appearance without discoloration, rashes, lesions. Urethral meatus normal in appearance, without masses. Vagina is negative for blood, discharge, lesions. Uterus is small, mobile, non tender. There is no cervical motion tenderness, adnexal masses/tenderness. General pelvic discomfort on bimanual without localized pain. Hypertonic pelvic floor musculature with point tenderness. Perineum and anus with normal architecture and " without rashes, lesions, discoloration.      Neurological:      Mental Status: She is alert.     Psychiatric:         Mood and Affect: Mood normal.         Behavior: Behavior normal.         Assessment/Plan   Problem List Items Addressed This Visit    None  Visit Diagnoses         Codes      Abnormal uterine bleeding (AUB)    -  Primary N93.9    Relevant Orders    CBC    TSH with reflex to Free T4 if abnormal    Prolactin    Testosterone,Free and Total    Follicle Stimulating Hormone    Estradiol    US PELVIS TRANSABDOMINAL WITH TRANSVAGINAL      Pregnancy test negative     Z32.02    Relevant Orders    POCT pregnancy, urine manually resulted (Completed)      Pelvic pain     R10.2    Relevant Orders    US PELVIS TRANSABDOMINAL WITH TRANSVAGINAL          Discussed findings on exam and clinical course.   Discussed common causes of irregular bleeding including unstable endometrium, anovulation/oligo-ovulation, hormonal dysfunction, PCOS, polyps/fibroids  Discussed common causes of pelvic pain, dysmenorrhea, dyspareunia including endometriosis, adenomyosis, increased pelvic inflammation, hypertonic musculature  Discussed symptomatic management options including scheduled NSAID's, Lysteda  Discussed pelvic floor PT  Discussed suppression management options including POP/GAYE, Xulane, NuvaRing, Depo Provera, Mirena, Nexplanon, and Orilissa  All questions answered and patient is considering options  Will call with all results         Rowena Owen DO 07/16/25 2:50 PM

## 2025-07-16 NOTE — PATIENT INSTRUCTIONS
Gynecology Visit for Pelvic Pain:  You were seen in office today for pelvic pain  Your exam was normal in office today  A pelvic ultrasound was ordered for you to evaluate the cause of your pelvic pain  Ovarian cysts are a common cause of pelvic pain and generally benign. Other causes of pelvic pain include endometriosis, adenomyosis, pelvic infections, and non gynecologic causes.   Final plan for management will depend on your imaging findings. You will receive results by phone/[employer]   Monitor for severe pelvic pain, sudden nausea/vomiting, difficulty with bowel or bladder function.   Call the office for any concerns. [phone] ([place]) or [phone] ([place])    Gynecologic Visit for Abnormal Uterine Bleeding:  You were seen in the office today for abnormal uterine bleeding  Abnormal uterine bleeding can include menstrual bleeding that is excessively heavy, painful, prolonged, bleeding between periods, bleeding after/during intercourse, or a combination of these  Causes can include hormonal changes affecting ovulation, unstable and/or thickened endometrial lining, structural abnormalities of the uterus like polyps or fibroids, adenomyosis or endometriosis, and cervical/vaginal abnormalities  You have been given orders for labs and ultrasound imaging. Please do these as soon as possible as the results will help guide necessity of further workup, such as endometrial sampling, and management options. You will receive results by phone/[employer]  Treatment options may include expectant/symptomatic treatment, hormonal suppression and support of the endometrium, surgical thinning of the endometrium with D&C and/or removal of endometrial abnormalities, uterine artery embolization, and hysterectomy  If you have any questions or concerns prior to discussion of lab and imaging results, please call the office. [phone] ([place]) or [phone] ([place])

## 2025-07-21 ENCOUNTER — HOSPITAL ENCOUNTER (OUTPATIENT)
Dept: RADIOLOGY | Facility: HOSPITAL | Age: 30
Discharge: HOME | End: 2025-07-21
Payer: MEDICAID

## 2025-07-21 DIAGNOSIS — R10.2 PELVIC PAIN: ICD-10-CM

## 2025-07-21 DIAGNOSIS — N93.9 ABNORMAL UTERINE BLEEDING (AUB): ICD-10-CM

## 2025-07-21 PROCEDURE — 76856 US EXAM PELVIC COMPLETE: CPT | Performed by: RADIOLOGY

## 2025-07-21 PROCEDURE — 76830 TRANSVAGINAL US NON-OB: CPT | Performed by: RADIOLOGY

## 2025-07-21 PROCEDURE — 76856 US EXAM PELVIC COMPLETE: CPT
